# Patient Record
(demographics unavailable — no encounter records)

---

## 2025-06-11 NOTE — PLAN
[FreeTextEntry1] : VMS: Reviewed R/B Thermella vs. Veozah vs. Brisdelle D/w pt option of Climera patch 0.05mg weekly w/ Prometrium 100mg PO daily. D/w pt associated slight increased risk of blood clots, stroke heart attack, and breast ca. Pt must be cleared by rheum w/ the appropriate testing for acla and apla prior given hx of mixed connective tissue disorder and Raynaud's before starting hrt. Rheum referral given.   topical vag estrogen products discussed  40 min face to face in consult

## 2025-06-11 NOTE — HISTORY OF PRESENT ILLNESS
[Patient reported mammogram was normal] : Patient reported mammogram was normal [Patient reported breast sonogram was normal] : Patient reported breast sonogram was normal [Patient reported bone density results were abnormal] : Patient reported bone density results were abnormal [FreeTextEntry1] : 2025. PRINCESS CABRERA 58-year-old female  LMP 2016 presents for consultation for menopausal sxs.  Patient reports worsening menopausal sxs over the past few months. She c/o restlessness, irritability, night sweats, and joint pain. She reports dyspareunia, secondary to vaginal atrophy. Admits noncompliance w/ Estrace cream.  As per pt, recent screening laboratories w/ PCP revealed elevated cholesterol, which is new for her.   Pt w/ hx of Raynaud's and mixed connective tissue disease, not managed by rheum.   GynHx: osteoporosis, atrophy PMH: bladder ca x2, Raynaud's, mixed connective tissue disease, reflux, osteoporosis, SHx: bladder sx x2, breast implant, hysteroscopy for polyp, appendectomy Meds: Linzess Allergies: IV contrast, seafood FHx: mother w/ breast ca at 43,  at 50. As per pt, her cancer genetic screening was negative, she has repeated them 4 times and all neg. Denies ovarian, uterine or colon cancer. [TextBox_4] : Pelvic sonogram 6/25: 2.4mm endometrium, no fluid in CDS, normal ovaries [Mammogramdate] : 04/25 [BreastSonogramDate] : 04/25 [BoneDensityDate] : 06/25 [TextBox_37] : osteoporosis, followed by levy Salazar [ColonoscopyDate] : 02/24

## 2025-06-11 NOTE — SIGNATURES
[TextEntry] : This note was authored by Alena Mendenhall working as a scribe for Dr. Anya Salvador.   I, Dr. Anya Salvador, have reviewed the content of this note and confirm it is true and accurate. I personally performed the history and physical examination and made all the decisions. 06/11/2025